# Patient Record
Sex: MALE | Race: ASIAN | Employment: FULL TIME | URBAN - METROPOLITAN AREA
[De-identification: names, ages, dates, MRNs, and addresses within clinical notes are randomized per-mention and may not be internally consistent; named-entity substitution may affect disease eponyms.]

---

## 2017-08-04 ENCOUNTER — HOSPITAL ENCOUNTER (EMERGENCY)
Age: 42
Discharge: HOME | End: 2017-08-04
Attending: EMERGENCY MEDICINE
Payer: COMMERCIAL

## 2017-08-04 VITALS
BODY MASS INDEX: 31.07 KG/M2 | DIASTOLIC BLOOD PRESSURE: 94 MMHG | HEART RATE: 110 BPM | SYSTOLIC BLOOD PRESSURE: 134 MMHG | HEIGHT: 68 IN | WEIGHT: 205 LBS | OXYGEN SATURATION: 97 % | TEMPERATURE: 98.3 F | RESPIRATION RATE: 18 BRPM

## 2017-08-04 DIAGNOSIS — J06.9 ACUTE UPPER RESPIRATORY INFECTION: ICD-10-CM

## 2017-08-04 DIAGNOSIS — R11.0 NAUSEA: ICD-10-CM

## 2017-08-04 DIAGNOSIS — H66.93 BILATERAL OTITIS MEDIA, UNSPECIFIED CHRONICITY, UNSPECIFIED OTITIS MEDIA TYPE: ICD-10-CM

## 2017-08-04 DIAGNOSIS — R19.7 DIARRHEA, UNSPECIFIED TYPE: ICD-10-CM

## 2017-08-04 DIAGNOSIS — K12.2 UVULITIS: Primary | ICD-10-CM

## 2017-08-04 LAB
DIRECT EXAM: NORMAL
Lab: NORMAL
SPECIMEN DESCRIPTION: NORMAL
STATUS: NORMAL

## 2017-08-04 PROCEDURE — 6360000002 HC RX W HCPCS: Performed by: EMERGENCY MEDICINE

## 2017-08-04 PROCEDURE — 87651 STREP A DNA AMP PROBE: CPT

## 2017-08-04 PROCEDURE — 6370000000 HC RX 637 (ALT 250 FOR IP): Performed by: EMERGENCY MEDICINE

## 2017-08-04 PROCEDURE — 99284 EMERGENCY DEPT VISIT MOD MDM: CPT

## 2017-08-04 RX ORDER — IBUPROFEN 800 MG/1
800 TABLET ORAL ONCE
Status: COMPLETED | OUTPATIENT
Start: 2017-08-04 | End: 2017-08-04

## 2017-08-04 RX ORDER — ONDANSETRON 4 MG/1
4 TABLET, FILM COATED ORAL ONCE
Status: COMPLETED | OUTPATIENT
Start: 2017-08-04 | End: 2017-08-04

## 2017-08-04 RX ORDER — AMOXICILLIN 500 MG/1
500 TABLET, FILM COATED ORAL 3 TIMES DAILY
Qty: 21 TABLET | Refills: 0 | Status: SHIPPED | OUTPATIENT
Start: 2017-08-04 | End: 2017-08-11

## 2017-08-04 RX ORDER — AMOXICILLIN 250 MG/1
500 CAPSULE ORAL ONCE
Status: COMPLETED | OUTPATIENT
Start: 2017-08-04 | End: 2017-08-04

## 2017-08-04 RX ORDER — ROSUVASTATIN CALCIUM 10 MG/1
10 TABLET, COATED ORAL DAILY
COMMUNITY
End: 2020-03-20 | Stop reason: SDUPTHER

## 2017-08-04 RX ORDER — ONDANSETRON 4 MG/1
4 TABLET, ORALLY DISINTEGRATING ORAL EVERY 8 HOURS PRN
Qty: 20 TABLET | Refills: 0 | Status: SHIPPED | OUTPATIENT
Start: 2017-08-04 | End: 2021-10-22

## 2017-08-04 RX ADMIN — AMOXICILLIN 500 MG: 250 CAPSULE ORAL at 22:02

## 2017-08-04 RX ADMIN — ONDANSETRON 4 MG: 4 TABLET, FILM COATED ORAL at 22:02

## 2017-08-04 RX ADMIN — IBUPROFEN 800 MG: 800 TABLET, FILM COATED ORAL at 22:02

## 2017-08-04 ASSESSMENT — PAIN DESCRIPTION - LOCATION: LOCATION: THROAT

## 2017-08-04 ASSESSMENT — PAIN SCALES - GENERAL
PAINLEVEL_OUTOF10: 2
PAINLEVEL_OUTOF10: 7

## 2017-08-04 ASSESSMENT — ENCOUNTER SYMPTOMS
NAUSEA: 1
SORE THROAT: 0
WHEEZING: 0
DIARRHEA: 1
SHORTNESS OF BREATH: 0
COUGH: 1
ABDOMINAL PAIN: 0
VOMITING: 1

## 2017-08-04 ASSESSMENT — PAIN DESCRIPTION - DESCRIPTORS: DESCRIPTORS: SORE

## 2017-08-05 LAB
DIRECT EXAM: NORMAL
DIRECT EXAM: NORMAL
Lab: NORMAL
Lab: NORMAL
SPECIMEN DESCRIPTION: NORMAL
SPECIMEN DESCRIPTION: NORMAL
STATUS: NORMAL

## 2020-03-05 PROBLEM — E66.09 CLASS 1 OBESITY DUE TO EXCESS CALORIES WITH SERIOUS COMORBIDITY AND BODY MASS INDEX (BMI) OF 31.0 TO 31.9 IN ADULT: Status: ACTIVE | Noted: 2020-03-05

## 2020-03-05 PROBLEM — E11.9 TYPE 2 DIABETES MELLITUS WITHOUT COMPLICATION, WITHOUT LONG-TERM CURRENT USE OF INSULIN (HCC): Status: ACTIVE | Noted: 2020-03-05

## 2020-03-05 PROBLEM — Z76.89 ENCOUNTER TO ESTABLISH CARE WITH NEW DOCTOR: Status: ACTIVE | Noted: 2020-03-05

## 2020-03-05 PROBLEM — R53.83 FATIGUE: Status: ACTIVE | Noted: 2020-03-05

## 2020-03-05 PROBLEM — R74.8 ELEVATED SERUM GGT LEVEL: Status: ACTIVE | Noted: 2020-03-05

## 2020-03-05 PROBLEM — Z12.5 SCREENING PSA (PROSTATE SPECIFIC ANTIGEN): Status: ACTIVE | Noted: 2020-03-05

## 2020-03-05 PROBLEM — E78.5 HYPERLIPIDEMIA: Status: ACTIVE | Noted: 2020-03-05

## 2020-03-05 PROBLEM — E66.811 CLASS 1 OBESITY DUE TO EXCESS CALORIES WITH SERIOUS COMORBIDITY AND BODY MASS INDEX (BMI) OF 31.0 TO 31.9 IN ADULT: Status: ACTIVE | Noted: 2020-03-05

## 2020-03-23 PROBLEM — E78.1 HIGH TRIGLYCERIDES: Status: ACTIVE | Noted: 2020-03-23

## 2020-04-04 PROBLEM — Z12.5 SCREENING PSA (PROSTATE SPECIFIC ANTIGEN): Status: RESOLVED | Noted: 2020-03-05 | Resolved: 2020-04-04

## 2020-10-27 PROBLEM — R00.0 TACHYCARDIA: Status: ACTIVE | Noted: 2020-10-27

## 2020-10-27 PROBLEM — R20.2 TINGLING: Status: ACTIVE | Noted: 2020-10-27

## 2020-10-27 PROBLEM — I10 ESSENTIAL HYPERTENSION: Status: ACTIVE | Noted: 2020-10-27

## 2021-02-06 PROBLEM — Z13.29 SCREENING FOR THYROID DISORDER: Status: ACTIVE | Noted: 2020-03-05

## 2021-02-17 PROBLEM — H60.92 OTITIS EXTERNA OF LEFT EAR: Status: ACTIVE | Noted: 2021-02-17

## 2021-02-17 PROBLEM — R19.12: Status: ACTIVE | Noted: 2021-02-17

## 2021-02-17 PROBLEM — H04.129 DRY EYE: Status: ACTIVE | Noted: 2021-02-17

## 2021-03-08 PROBLEM — Z13.29 SCREENING FOR THYROID DISORDER: Status: RESOLVED | Noted: 2020-03-05 | Resolved: 2021-03-08

## 2021-06-02 PROBLEM — I27.20 MILD PULMONARY HYPERTENSION (HCC): Status: ACTIVE | Noted: 2021-06-02

## 2021-10-25 PROBLEM — J02.8 ACUTE PHARYNGITIS DUE TO OTHER SPECIFIED ORGANISMS: Status: ACTIVE | Noted: 2021-10-25

## 2021-10-25 PROBLEM — R37 SEXUAL DYSFUNCTION: Status: ACTIVE | Noted: 2021-10-25

## 2021-10-25 PROBLEM — M25.512 ACUTE PAIN OF LEFT SHOULDER: Status: ACTIVE | Noted: 2021-10-25

## 2021-10-25 PROBLEM — M54.2 NECK ACHE: Status: ACTIVE | Noted: 2021-10-25

## 2021-11-05 PROBLEM — M50.30 DEGENERATIVE DISC DISEASE, CERVICAL: Status: ACTIVE | Noted: 2021-11-05

## 2021-11-15 ENCOUNTER — HOSPITAL ENCOUNTER (OUTPATIENT)
Dept: PHYSICAL THERAPY | Facility: CLINIC | Age: 46
Setting detail: THERAPIES SERIES
Discharge: HOME | End: 2021-11-15
Payer: COMMERCIAL

## 2021-11-15 PROCEDURE — 97110 THERAPEUTIC EXERCISES: CPT

## 2021-11-15 PROCEDURE — 97161 PT EVAL LOW COMPLEX 20 MIN: CPT

## 2021-11-15 NOTE — CONSULTS
[] [facility]) - [facility] [[email or web address]] [facility]  [facility]  [address]  [phone]  F: [phone] [x] [address]  [place] [address]   [address]  P: [phone]  F: [phone] [] [address] &  Therapy  [address]  P: [phone]  F: [phone] [] [address]  P: [phone]  F: [phone] [] [address]  [facility]   [address]   [place]: [phone]  F: [phone]      Physical Therapy Spine Evaluation    Date:  11/15/2021  Patient: Edwena Councilman  : 1975  MRN: [id number]  Physician: Ani Gallo MD  Insurance: [employer] (83B/20, $30 copay)  Medical Diagnosis: M50.30- Degenerative disc disease, cervical  Rehab Codes: M50.30, M25.512, 929.3, M54. 2  Onset Date: 2021    Next 's appt.:     Subjective:   CC: 51 y.o. male presents to physical therapy with complaint of neck and L shoulder pain. HPI: (onset date)  Initial pain began in august with insidious onset. Patient does recall leaving his office carrying a heavy bookbag and holding a coffee cup in L hand and experienced first sharp pain. Pain has been intermittent following onset but has not improved. Pt describes pain as pinching and tightness through L neck/scapular region. Pain does not radiate through L arm. Patient works with an [employer] and works with 2 laptops having to maintain static positions prolonged. Pt does not report increased headaches or migraines but does note occasional dizziness when bending forward. Pt describes slight discomfort through R hand/fingers as well that he attributes to his job. Patient has been managing pain with medication occasionally, and stretching. Pt was prescribed muscle relaxants but has not taken them yet.  Pt notes a reduction in strength in his hands with difficulty opening jars/2 liters. Patient is R handed. PMHx: [x] Diabetes               [x] Refer to full medical chart  In EPIC       Comorbidities:   [] Obesity [] Dialysis  [] N/A   [] Asthma/COPD [] Dementia [x] Other:  Diabetes    [] Stroke [] Sleep apnea [] Other:   [] Vascular disease [] Rheumatic disease [] Other:     Tests: [x] X-Ray: Multilevel endplate degenerative changes, with disc height loss at C6-7. AC joint degenerative changes. [] MRI:  [] Other:    Medications: [x] Refer to full medical record [] None [] Other:  Allergies:      [] Refer to full medical record [x] None [] Other:    Function:  Hand Dominance  [x] Right  [] Left  Patient lives with: wife   In what type of home [x]  One story   [] Two story   [] Split level   Number of stairs to enter 0   With handrail on the []  Right to enter   [] Left to enter   Bathroom has a []  Tub only  [x] Tub/shower combo   [] Walk in shower    []  Grab bars   Washing machine is on [x]  Main level   [] Second level   [] Basement   Employer IT tech   Job Status [x]  Normal duty   [] Light duty   [] Off due to condition    []  Retired   [] Not employed   [] Disability  [] Other:  []  Return to work:    Work activities/duties Sitting prolonged, computer work, typing   Recreational activities  N/A       ADL/IADL Previous level of function Current level of function Who currently assists the patient with task   Bathing  [x] Independent  [] Assist [x] Independent  [] Assist    Dress/grooming [x] Independent  [] Assist [x] Independent  [] Assist    Transfer/mobility [x] Independent  [] Assist [x] Independent  [] Assist    Feeding [x] Independent  [] Assist [x] Independent  [] Assist    Toileting [x] Independent  [] Assist [x] Independent  [] Assist    Driving [x] Independent  [] Assist [] Independent  [x] Assist Wife   Housekeeping [x] Independent  [] Assist [x] Independent  [] Assist    Grocery shop/meal prep [x] Independent  [] Assist [x] Independent  [] Assist      Gait Prior level of function Current level of function    [x] Independent  [] Assist [x] Independent  [] Assist   Device: [x] Independent [x] Independent       Symptoms:    Pain present? Yes   Location L sided neck and shoulder    Pain Rating currently 4/10   Pain at worst 8/10   Pain at best 0/10   Description of pain Intermittent, pinching, tightness    Altered Sensation None    What makes it worse Sitting prolonged, lifting and carrying   What makes it better Stretching, medications   Symptom progression static   Sleep Not disturbed            Objective:      STRENGTH  ROM    Left Right Cervical    C5 Shld Abd 5 5 Flexion 40   Shld Flexion 5 5 Extension 50   Shld IR 5 5 Rotation L- 60 R- 55   Shld ER 5 5 Sidebend L- 30 R- 40   C6 Elb Flex 5 5 Retraction    C7 Elb Ext 5 5 Lumbar    C8 EPL   Flexion    T1 Fing Abd   Extension     78 58 Rotation L  R      Sidebend L R      UE/LE L R      shoulder WFL globally WFL globally                                      TESTS (+/-) LEFT RIGHT Not Tested   Cerv. Comp - - []   Cerv. Distraction - - []   Cerv. Alar/Transverse - - []   Vertebral Artery - - []   Adsons - - []   Adryan Ayoub - - []   Hu Tests ? Pain ?  Pain No Change Not Tested   RFIS [] [] [] [x]   NELSON [] [] [] [x]   RFIL [] [] [] [x]   REIL [] [] [] [x]   Rep Prot [] [] [x] []   Rep Retract [] [] [x] []       OBSERVATION No Deficit Deficit Not Tested Comments   Posture       Forward Head [] [x] []    Rounded Shoulders [] [x] []    Kyphosis [] [x] [] Increased thoracic kyphosis   Lordosis [] [x] [] Reduced cervical lordosis   Slumped Sitting [] [x] []    Palpation [] [x] [] Increased tone through L UT globally, L sided cervical paraspinals, suboccipitals   Sensation [x] [] []    Edema [x] [] []    Neurological [x] [] []        Functional Test: NDI Score: 10/50 or 20% functionally impaired     Comments:  Pt with over activation of L UT with scapular retraction, improved with sitting at work. 5. Patient to be independent with home exercise program as demonstrated by performance with correct form without cues. LTG: (to be met in 12 treatments)  1. Pt to report neck pain levels at 0-1/10 to improve tolerance with all daily activities. 2. Pt to demonstrate improved function with NDI score of 10% functionally impaired or less. 3. Pt to demonstrate improved functional strength with ability to lift and carry at least 10# without increased pain. Patient goals: improve strength and flexibility     Rehab Potential:  [x] Good  [] Fair  [] Poor   Suggested Professional Referral:  [x] No  [] Yes:  Barriers to Goal Achievement:  [x] No  [] Yes:  Domestic Concerns:  [x] No  [] Yes:    Pt. Education:  [x] Plans/Goals, Risks/Benefits discussed  [x] Home exercise program  Method of Education: [x] Verbal  [x] Demo  [x] Written   11/15/21: HEP provided of charted exercises, [employer] #[id number], postural education  Comprehension of Education:  [x] Verbalizes understanding. [x] Demonstrates understanding. [x] Needs Review. [] Demonstrates/verbalizes understanding of HEP/Ed previously given.     Treatment Plan:  [x] Therapeutic Exercise   83204  [] Iontophoresis: 4 mg/mL Dexamethasone Sodium Phosphate  mAmin  18643   [] Therapeutic Activity  32995 [] Vasopneumatic cold with compression  11306    [] Gait Training   61036 [] Ultrasound   68985   [x] Neuromuscular Re-education  09412 [] Electrical Stimulation Unattended  98624   [x] Manual Therapy  63429 [] Electrical Stimulation Attended  79890   [x] Instruction in HEP  [] Lumbar/Cervical Traction  00051   [] Aquatic Therapy   80496 [x] Cold/hotpack    [] Massage   23430      [] Dry Needling, 1 or 2 muscles  81660   [] Biofeedback, first 15 minutes   42973  [] Biofeedback, additional 15 minutes   29237 [] Dry Needling, 3 or more muscles  90824     []  Medication allergies reviewed for use of    Dexamethasone Sodium Phosphate 4mg/ml     with iontophoresis treatments. Pt is not allergic. Frequency:  2 x/week for 12 visits (will be on 1 month hold beginning 11/26 due to traveling out of the country)    [address] [employer] Treatment:  Modalities:   Precautions:  Exercises:  Exercise Reps/ Time Weight/ Level Comments         Seated chin tucks 10x5\"     Scapular retractions 10x5\"      UT stretch 3x20\"     Doorway Pec stretch 3x20\"       Other:  Postural education provided for seated positioning during work       Specific Instructions for next treatment: manual to L UT, SOR, cervical paraspinals, progress scapular and deep cervical flexor strengthening, restore cervical ROM, reinforce postural education      Evaluation Complexity:  History (Personal factors, comorbidities) [] 0 [x] 1-2 [] 3+   Exam (limitations, restrictions) [] 1-2 [] 3 [x] 4+   Clinical presentation (progression) [x] Stable [] Evolving  [] Unstable   Decision Making [x] Low [] Moderate [] High    [x] Low Complexity [] Moderate Complexity [] High Complexity       Treatment Charges: Mins Units   [x] Evaluation       [x]  Low       []  Moderate       []  High 35 1   []  Modalities     [x]  Ther Exercise 10 1   []  Manual Therapy     []  Ther Activities     []  Aquatics     []  Vasocompression     []  Other       TOTAL TREATMENT TIME: 45 minutes     Time in: 12:05 pm      Time out: 12:50 pm    Electronically signed by: Serenity Jones PT        Physician Signature:________________________________Date:__________________  By signing above or cosigning this note, I have reviewed this plan of care and certify a need for medically necessary rehabilitation services.      *PLEASE SIGN ABOVE AND FAX BACK ALL PAGES*

## 2021-11-18 ENCOUNTER — HOSPITAL ENCOUNTER (OUTPATIENT)
Dept: PHYSICAL THERAPY | Facility: CLINIC | Age: 46
Setting detail: THERAPIES SERIES
Discharge: HOME | End: 2021-11-18
Payer: COMMERCIAL

## 2021-11-18 PROCEDURE — 97140 MANUAL THERAPY 1/> REGIONS: CPT

## 2021-11-18 PROCEDURE — 97110 THERAPEUTIC EXERCISES: CPT

## 2021-11-18 NOTE — FLOWSHEET NOTE
[] [facility]) - [facility] &  Therapy  [address]  [phone]  F: [phone] [] [address]  [facility] [address]   [address]  P: [phone]  F: [phone] [] [address] &  Therapy  [address]  P: [phone]  F: [phone] [] [address]  P: [phone]  F: [phone] [] [address]  [facility]   [address]   [place]: [phone]  F: [phone]      Physical Therapy Daily Treatment Note    Date:  2021  Patient Name:  Taz High    :  1975  MRN: [id number]  Physician: Samuel Durham MD               Insurance: [employer] (70V/60, $30 copay)  Medical Diagnosis: M50.30- Degenerative disc disease, cervical              Rehab Codes: M50.30, M25.512, 929.3, M54. 2  Onset Date: 2021                              Next 's appt. :   Visit# / total visits: ;  (will be on 1 month hold beginning  due to traveling out of the country)   Cancels/No Shows :0 /0    Subjective:    Pain:  [x] Yes  [] No Location: L sided neck and shoulder  Pain Rating: (0-10 scale) did not give numerical value/10  Pain altered Tx:  [x] No  [] Yes  Action:  Comments :Pt arrives noting he is okay, somewhat sore from exercise but has been doing them    Objective:  Modalities:   Precautions:  Exercises:  Exercise Reps/ Time Weight/ Level Comments   UBE 2'/2' lv 1           Manual :  11 min   SOR, UT releases, MFR to c paraspinals         Supine       Chin tucks 10x5\"      Cervical mobs over ball 5x5\" ea  All directions               Seated      Chin tucks 10x5\"       Scapular retractions 10x2   5\"        Retro shoulder rolls       UT stretch 3x20\"                     Tband rows 20x  blue    Wall angels 10x     Wall push ups 2x10 Doorway Pec stretch 3x20\"        Other:  Postural education provided for seated positioning during work         Specific Instructions for next treatment: manual to L UT, SOR, cervical paraspinals, progress scapular and deep cervical flexor strengthening, restore cervical ROM, reinforce postural education         Treatment Charges: Mins Units   []  Modalities     []  Ther Exercise 30 2   []  Manual Therapy 11 1   []  Ther Activities     []  Aquatics     []  Vasocompression     []  Other     Total Treatment time 41 3       Assessment: [x] Progressing toward goals. Initiated treatment with warm up followed by manual interventions. Pt with increased muscular tension present in cervical paraspinals. Cueing to ensure proper exercise completion this date. Pt able to complete all interventions as indicated. Cueing to ensure patient is conscious of posture with exercises. Provided patient with putty, tband and a new updated HEP. Will need to review and add to it prior to him leaving for [facility] for 1 month. [] No change. [] Other:  [] Patient would continue to benefit from skilled physical therapy services in order to: reduce pain, restore cervical mobility, and increase deep cervical flexor and scapular strengthening in order to reduce difficulty with work and household related tasks. Problems:    [x]? ? Pain: up to 8/10 pain L sided neck and shoulder  [x]? ? ROM: reduced cervical ROM globally   [x]? ? Strength: reduced deep cervical flexor and scapular strengthening  [x]? ? Function: impaired function indicated by NDI score of 20% impaired   [x]? Other: impaired posture       STG: (to be met in 8 treatments)  1. ? Pain: Pt to report neck pain levels at 5/10 or less to improve tolerance to therapeutic exercise progressions. 2. ? ROM: Pt to improve cervical rotation AROM to at least 70 degrees and cervical sidebend to at least 45 degrees bilaterally to ease difficulty with ADLs.    3. ? Strength: Pt to demonstrate improved scapular strength at 4+/5 globally to improve independence with heavier lifting and carrying activities. a. Pt to demonstrate increased deep cervical flexor strengthening with ability to perform 10 chin tucks without increased pain or compensation. 4. ? Function: Pt to demonstrate improved postural awareness in seated position for reduced pain with sustained sitting at work. 5. Patient to be independent with home exercise program as demonstrated by performance with correct form without cues.     LTG: (to be met in 12 treatments)  1. Pt to report neck pain levels at 0-1/10 to improve tolerance with all daily activities. 2. Pt to demonstrate improved function with NDI score of 10% functionally impaired or less. 3. Pt to demonstrate improved functional strength with ability to lift and carry at least 10# without increased pain.      Patient goals: improve strength and flexibility     Pt. Education:  [x] Yes  [] No  [] Reviewed Prior HEP/Ed  Method of Education: [x] Verbal  [] Demo  [x] Written  Access Code: [email or web address]  URL: [employer]/  Date: 11/19/2021  Prepared by: Paras Reece    Exercises  Seated Upper Trapezius Stretch - 1 x daily - 7 x weekly - 3 sets - 20\" hold  Seated Scapular Retraction - 1 x daily - 7 x weekly - 10 reps - 3\" hold  Correct Seated Posture - 1 x daily - 7 x weekly  Seated Cervical Retraction - 1 x daily - 7 x weekly - 3 sets - 10 reps  Doorway Pec Stretch at 90 Degrees Abduction - 1 x daily - 7 x weekly - 3 sets - 10 reps  Putty Squeezes - 1 x daily - 7 x weekly - 2 sets - 10 reps  Scapular Retraction with Resistance - 1 x daily - 7 x weekly - 2 sets - 10 reps  Standing Shoulder External Rotation with Resistance - 1 x daily - 7 x weekly - 2 sets - 10 reps  Wall Push Up - 1 x daily - 7 x weekly - 2 sets - 10 reps  Wall [place] - 1 x daily - 7 x weekly - 1 sets - 10 reps    Comprehension of Education:  [x] Verbalizes understanding.   [] Demonstrates understanding. [x] Needs review. [] Demonstrates/verbalizes HEP/Ed previously given. Plan: [x] Continue current frequency toward long and short term goals.     [x] Specific Instructions for subsequent treatments:progress as able, review HEP for when he is out of town      Time In:5 pm            Time Out: 555 pm     Electronically signed by:  Porsha Chaudhry PTA

## 2021-11-22 ENCOUNTER — HOSPITAL ENCOUNTER (OUTPATIENT)
Dept: PHYSICAL THERAPY | Facility: CLINIC | Age: 46
Setting detail: THERAPIES SERIES
Discharge: HOME | End: 2021-11-22
Payer: COMMERCIAL

## 2021-11-22 PROCEDURE — 97140 MANUAL THERAPY 1/> REGIONS: CPT

## 2021-11-22 PROCEDURE — 97110 THERAPEUTIC EXERCISES: CPT

## 2021-11-22 NOTE — FLOWSHEET NOTE
[] [facility]) - [facility] &  Therapy  [address]  [phone]  F: [phone] [] [address]  [facility]   [address]  P: [phone]  F: [phone] [] [address] &  Therapy  [address]  P: [phone]  F: [phone] [] [address]  P: [phone]  F: [phone] [] [address]  [facility]   [address]   [place]: [phone]  F: [phone]      Physical Therapy Daily Treatment Note    Date:  2021  Patient Name:  Ean Mason    :  1975  MRN: [id number]  Physician: Jefe Orr MD               [facility] (81P/62, $30 copay)  Medical Diagnosis: M50.30- Degenerative disc disease, cervical              Rehab Codes: M50.30, M25.512, 929.3, M54. 2  Onset Date: 2021                              Next 's appt. :   Visit# / total visits: 3/12;  (will be on 1 month hold beginning  due to traveling out of the country)   Cancels/No Shows :0 /0    Subjective:    Pain:  [x] Yes  [] No Location: L sided neck and shoulder  Pain Rating: (0-10 scale) 3/10  Pain altered Tx:  [x] No  [] Yes  Action:    Comments :Pt states his shoulder and neck pain are improving, however he is feeling more dizzy that before.      Objective:  Modalities:   Precautions:  Exercises:  Exercise Reps/ Time Weight/ Level Comments   UBE 2'/2' lv 1           Manual :  12 min   SOR, UT releases, MFR to c paraspinals         Supine       Chin tucks 10x5\"      Cervical mobs over ball   All directions               Seated      Chin tucks 10x5\"       Scapular retractions 10x2   5\"        Retro shoulder rolls       UT stretch 3x20\"                     Tband rows 20x  blue    tband ER  Lime     tband shoulder extension   Lime     tricep press  Lime     Wall angels 10x, 3\" hold     Wall push ups 2x10     Doorway Pec stretch 3x20\"        Other:  Postural education provided for seated positioning during work         Specific Instructions for next treatment: manual to L UT, SOR, cervical paraspinals, progress scapular and deep cervical flexor strengthening, restore cervical ROM, reinforce postural education         Treatment Charges: Mins Units   []  Modalities     []  Ther Exercise 20 1   []  Manual Therapy 12 1   []  Ther Activities     []  Aquatics     []  Vasocompression     []  Other     Total Treatment time 32 2       Assessment: [x] Progressing toward goals. Continued with treatment as before, progressing exercises with good tolerance. Updated pt's HEP and issued a lime tband for him to include in his travels for HEP consistency. Pt denies complains following session. [] No change. [] Other:  [] Patient would continue to benefit from skilled physical therapy services in order to: reduce pain, restore cervical mobility, and increase deep cervical flexor and scapular strengthening in order to reduce difficulty with work and household related tasks. Problems:    [x]? ? Pain: up to 8/10 pain L sided neck and shoulder  [x]? ? ROM: reduced cervical ROM globally   [x]? ? Strength: reduced deep cervical flexor and scapular strengthening  [x]? ? Function: impaired function indicated by NDI score of 20% impaired   [x]? Other: impaired posture       STG: (to be met in 8 treatments)  1. ? Pain: Pt to report neck pain levels at 5/10 or less to improve tolerance to therapeutic exercise progressions. 2. ? ROM: Pt to improve cervical rotation AROM to at least 70 degrees and cervical sidebend to at least 45 degrees bilaterally to ease difficulty with ADLs. 3. ? Strength: Pt to demonstrate improved scapular strength at 4+/5 globally to improve independence with heavier lifting and carrying activities. a.  Pt to demonstrate increased deep cervical flexor strengthening with ability to perform 10 chin tucks without increased pain or compensation. 4. ? Function: Pt to demonstrate improved postural awareness in seated position for reduced pain with sustained sitting at work. 5. Patient to be independent with home exercise program as demonstrated by performance with correct form without cues.     LTG: (to be met in 12 treatments)  1. Pt to report neck pain levels at 0-1/10 to improve tolerance with all daily activities. 2. Pt to demonstrate improved function with NDI score of 10% functionally impaired or less. 3. Pt to demonstrate improved functional strength with ability to lift and carry at least 10# without increased pain.      Patient goals: improve strength and flexibility     Pt. Education:  [x] Yes  [] No  [] Reviewed Prior HEP/Ed  Method of Education: [x] Verbal  [x] Demo  [x] Written- updated medbridge and issued lime band  Access Code: [email or web address]  URL: [email or web address]  Date: 11/19/2021  Prepared by: Fide Bedoya    Exercises  Seated Upper Trapezius Stretch - 1 x daily - 7 x weekly - 3 sets - 20\" hold  Seated Scapular Retraction - 1 x daily - 7 x weekly - 10 reps - 3\" hold  Correct Seated Posture - 1 x daily - 7 x weekly  Seated Cervical Retraction - 1 x daily - 7 x weekly - 3 sets - 10 reps  Doorway Pec Stretch at 90 Degrees Abduction - 1 x daily - 7 x weekly - 3 sets - 10 reps  Putty Squeezes - 1 x daily - 7 x weekly - 2 sets - 10 reps  Scapular Retraction with Resistance - 1 x daily - 7 x weekly - 2 sets - 10 reps  Standing Shoulder External Rotation with Resistance - 1 x daily - 7 x weekly - 2 sets - 10 reps  Wall Push Up - 1 x daily - 7 x weekly - 2 sets - 10 reps  Wall [place] - 1 x daily - 7 x weekly - 1 sets - 10 reps    Comprehension of Education:  [x] Verbalizes understanding. [x] Demonstrates understanding. [x] Needs review. [] Demonstrates/verbalizes HEP/Ed previously given.      Plan: [x] Continue current frequency toward long and short term goals.     [x] Specific Instructions for subsequent treatments:progress as able, review HEP for when he is out of town      Time In: 6:00pm              Time Out: 6:36pm     Electronically signed by:  Deepthi Noble PTA

## 2021-11-23 ENCOUNTER — HOSPITAL ENCOUNTER (OUTPATIENT)
Dept: PHYSICAL THERAPY | Facility: CLINIC | Age: 46
Setting detail: THERAPIES SERIES
Discharge: HOME | End: 2021-11-23
Payer: COMMERCIAL

## 2021-11-23 PROCEDURE — 97110 THERAPEUTIC EXERCISES: CPT

## 2021-11-23 PROCEDURE — 97140 MANUAL THERAPY 1/> REGIONS: CPT

## 2021-11-23 NOTE — FLOWSHEET NOTE
[] [facility]) - [facility] &  Therapy  [address]  [phone]  F: [phone] [] [address]  [place] [address]   [address]  P: [phone]  F: [phone] [] [address] &  Therapy  [address]  P: [phone]  F: [phone] [] [address]  P: [phone]  F: [phone] [] [address]  [facility]   [address]   [place]: [phone]  F: [phone]      Physical Therapy Daily Treatment Note    Date:  2021  Patient Name:  Julio Thomas    :  1975  MRN: [id number]  Physician: Mini Guerra MD               Insurance: [employer] (68O/83, $30 copay)  Medical Diagnosis: M50.30- Degenerative disc disease, cervical              Rehab Codes: M50.30, M25.512, 929.3, M54. 2  Onset Date: 2021                              Next 's appt. :   Visit# / total visits: ;  (will be on 1 month hold beginning  due to traveling out of the country)   Cancels/No Shows :0 /0    Subjective:    Pain:  [] Yes  [x] No Location: L sided neck and shoulder  Pain Rating: (0-10 scale) 0/10  Pain altered Tx:  [x] No  [] Yes  Action:  Comments :Pt arrives without pain or soreness this date. Pt notes that it depends what he is doing with his neck that will increase soreness but overall feels good.      Objective:  Modalities:   Precautions:  Exercises:  Exercise Reps/ Time Weight/ Level Comments   UBE 2'/2' lv 1           Manual :  11 min   SOR, UT releases, MFR to c paraspinals         Supine       Chin tucks 10x5\"      Chin tucks + rotation 5x5\"ea     Cervical mobs over ball 5x5\" ea  All directions   Scapular Depression 10x5\"  Added          Seated      Chin tucks 10x5\"       Scapular retractions 10x2   5\"        Retro shoulder rolls 10x     UT stretch 3x20\"                     Tband rows 20x  blue    B. ER 20x blue Chin Tuck- against wall for cueing - added 11/23   B. H. Abd 20x blue Chin Tuck- against wall for cueing- added 11/23   Wall angels 10x  Cues for chin tuck   Wall push ups 2x10     Reverse wall push up 2x10     Doorway Pec stretch 3x20\"        Other:  Postural education provided for seated positioning during work         Specific Instructions for next treatment: manual to L UT, SOR, cervical paraspinals, progress scapular and deep cervical flexor strengthening, restore cervical ROM, reinforce postural education         Treatment Charges: Mins Units   []  Modalities     []  Ther Exercise 32 2   []  Manual Therapy 12 1   []  Ther Activities     []  Aquatics     []  Vasocompression     []  Other     Total Treatment time 44 3       Assessment: [x] Progressing toward goals. Initiated treatment with warm up followed by manual interventions. Pt with increased muscular tension present in cervical paraspinals and bilateral UT with fair relief of tissue tightness following manual. Continued with charted exercises adding reverse wall push ups, bilateral ER and horizontal abduction with chin tuck, and scapular depression. Reviewed previous HEP as well. Pt with good tolerance to all exercises and progressions this date and is able to complete previous exercises with minimal cueing for appropriate technique. Pt to be on hold until last week of December as he is going out of the country. Plan to continue HEP independently. [] No change. [] Other:  [] Patient would continue to benefit from skilled physical therapy services in order to: reduce pain, restore cervical mobility, and increase deep cervical flexor and scapular strengthening in order to reduce difficulty with work and household related tasks. Problems:    [x]? ? Pain: up to 8/10 pain L sided neck and shoulder  [x]? ? ROM: reduced cervical ROM globally   [x]? ?  Strength: reduced deep cervical flexor and scapular strengthening  [x]? ? Function: impaired function indicated by NDI score of 20% impaired   [x]? Other: impaired posture       STG: (to be met in 8 treatments)  1. ? Pain: Pt to report neck pain levels at 5/10 or less to improve tolerance to therapeutic exercise progressions. 2. ? ROM: Pt to improve cervical rotation AROM to at least 70 degrees and cervical sidebend to at least 45 degrees bilaterally to ease difficulty with ADLs. 3. ? Strength: Pt to demonstrate improved scapular strength at 4+/5 globally to improve independence with heavier lifting and carrying activities. a. Pt to demonstrate increased deep cervical flexor strengthening with ability to perform 10 chin tucks without increased pain or compensation. 4. ? Function: Pt to demonstrate improved postural awareness in seated position for reduced pain with sustained sitting at work. 5. Patient to be independent with home exercise program as demonstrated by performance with correct form without cues.     LTG: (to be met in 12 treatments)  1. Pt to report neck pain levels at 0-1/10 to improve tolerance with all daily activities. 2. Pt to demonstrate improved function with NDI score of 10% functionally impaired or less. 3. Pt to demonstrate improved functional strength with ability to lift and carry at least 10# without increased pain.      Patient goals: improve strength and flexibility     Pt. Education:  [x] Yes  [] No  [] Reviewed Prior HEP/Ed  Method of Education: [x] Verbal  [x] Demo  [] Written  Access Code: [email or web address]  URL: [email or web address]. com/  Date: 11/19/2021  Prepared by: Yariel Phoenix    Exercises  Seated Upper Trapezius Stretch - 1 x daily - 7 x weekly - 3 sets - 20\" hold  Seated Scapular Retraction - 1 x daily - 7 x weekly - 10 reps - 3\" hold  Correct Seated Posture - 1 x daily - 7 x weekly  Seated Cervical Retraction - 1 x daily - 7 x weekly - 3 sets - 10 reps  Doorway Pec Stretch at 90 Degrees Abduction - 1 x daily - 7 x weekly - 3 sets - 10 reps  Putty Squeezes - 1 x daily - 7 x weekly - 2 sets - 10 reps  Scapular Retraction with Resistance - 1 x daily - 7 x weekly - 2 sets - 10 reps  Standing Shoulder External Rotation with Resistance - 1 x daily - 7 x weekly - 2 sets - 10 reps  Wall Push Up - 1 x daily - 7 x weekly - 2 sets - 10 reps  Wall [place] - 1 x daily - 7 x weekly - 1 sets - 10 reps    11/23- updated medbridge code [id number] to include scapular depression, bilateral ER, bilateral H. Abd   Comprehension of Education:  [] Verbalizes understanding. [] Demonstrates understanding. [] Needs review. [x] Demonstrates/verbalizes HEP/Ed previously given. Plan: [] Continue current frequency toward long and short term goals. [x] Specific Instructions for subsequent treatments:hold PT until December 27th d/t pt being out of town       Time In: 11:00 am             Time Out:  11:48 am    Electronically signed by:   Che Castaneda PT

## 2021-12-27 ENCOUNTER — HOSPITAL ENCOUNTER (OUTPATIENT)
Dept: PHYSICAL THERAPY | Facility: CLINIC | Age: 46
Setting detail: THERAPIES SERIES
Discharge: HOME | End: 2021-12-27
Payer: COMMERCIAL

## 2021-12-27 NOTE — FLOWSHEET NOTE
[] [facility]) - [facility] &  Therapy  [address]    [phone]  F: [phone]   [x] [address]  [facility] 36   [address]  P: [phone]  F: [phone]  [] [address]  [address]  P: [phone]  F: [phone] [] [address]  P: [phone]  F: [phone]  [] [address]  [facility]   [address]   [place]: [phone]  F: [phone]   [] [facility]  [address]  [place]: [phone]   F: [phone]     Physical Therapy Cancel/No Show note    Date: 2021  Patient: Chadd Jay  : 1975  MRN: [id number]    Cancels/No Shows to date:     For today's appointment patient:    [x]  Cancelled    [] Rescheduled appointment    [] No-show     Reason given by patient:    []  Patient ill    []  Conflicting appointment    [] No transportation      [] Conflict with work    [] No reason given    [] Weather related    [] COVID-19    [x] Other:      Comments: Pt self quarantined due to recent out of the country travel.          [x] Next appointment was confirmed    Electronically signed by: Ana Luisa Ball PTA

## 2021-12-30 ENCOUNTER — HOSPITAL ENCOUNTER (OUTPATIENT)
Dept: PHYSICAL THERAPY | Facility: CLINIC | Age: 46
Setting detail: THERAPIES SERIES
Discharge: HOME | End: 2021-12-30
Payer: COMMERCIAL

## 2021-12-30 PROCEDURE — 97110 THERAPEUTIC EXERCISES: CPT

## 2021-12-30 NOTE — FLOWSHEET NOTE
[] [facility]) - [facility] &  Therapy  [address]  [phone]  F: [phone] [x] [address]  [place] [address]   [address]  P: [phone]  F: [phone] [] [address] &  Therapy  [address]  P: [phone]  F: [phone] [] [address]  P: [phone]  F: [phone] [] [address]  [facility]   [address]   [place]: [phone]  F: [phone]      Physical Therapy Daily Treatment Note    Date:  2021  Patient Name:  Sary Palmer    :  1975  MRN: [id number]  Physician: Samara Montes MD               Insurance: [employer] (75Z/60, $30 copay)  Medical Diagnosis: M50.30- Degenerative disc disease, cervical              Rehab Codes: M50.30, M25.512, 929.3, M54. 2  Onset Date: 2021                              Next 's appt. :   Visit# / total visits: ; Cancels/No Shows :0 /0    Subjective:    Pain:  [] Yes  [x] No Location: L sided neck and shoulder  Pain Rating: (0-10 scale) 0/10  Pain altered Tx:  [x] No  [] Yes  Action:  Comments : Pt arrives noting that he is doing much better since his trip, minimal to no pain.      Objective:  Modalities:   Precautions:  Exercises:  Exercise Reps/ Time Weight/ Level Comments   UBE 2'/2' lv 1           Manual:  11 min   SOR, UT releases, MFR to c paraspinals - not today          Supine       Chin tucks 10x5\"   not today    Chin tucks + rotation 5x5\"ea     Cervical mobs over ball 5x5\" ea  All directions   Scapular Depression 10x5\"  Added  not today          Seated      Chin tucks 10x5\"       Scapular retractions 10x2   5\"        Retro shoulder rolls  10x2  Inc    UT stretch 3x20\"                     Tband rows 20x plum Inc    tband ext 20x plum Added 12/30   B. ER 20x blue Chin Tuck- against wall for cueing - added 11/23   B. H. Abd 20x blue Chin Tuck- against wall for cueing- added 11/23   Wall angels 10x  Cues for chin tuck   Wall push ups 2x10     Reverse wall push up 2x10     Doorway Pec stretch 3x30\"     Resisted wall taps 10 x ea  lime Lateral with scap retration Added 12/30   Resisted wall taps - inferior and superior diagonals 5x ea Lime Added 12/30   Rhythmic stab with ball  Hold at 90°  3x30\"  Small stability ball Added 12/30   SA slides 2x10  Added 12/30            Other:  Postural education provided for seated positioning during work         Specific Instructions for next treatment: manual to L UT, SOR, cervical paraspinals, progress scapular and deep cervical flexor strengthening, restore cervical ROM, reinforce postural education         Treatment Charges: Mins Units   []  Modalities     []  Ther Exercise 39 3   []  Manual Therapy     []  Ther Activities     []  Aquatics     []  Vasocompression     []  Other     Total Treatment time 39 3       Assessment: [x] Progressing toward goals. Initiated session again with warm up on the UBE. Pt able to make progressions with charted above with previous exercises. Pt also able to make progressions with new interventions. Continued cueing to implement cervical retraction to assist with improved posture and decreased forward flexed cervical spine. Cueing also to improve scap retraction during interventions. Pt denies pain end of session, will continue to progress as able. [] No change. [] Other:  [] Patient would continue to benefit from skilled physical therapy services in order to: reduce pain, restore cervical mobility, and increase deep cervical flexor and scapular strengthening in order to reduce difficulty with work and household related tasks. Problems:    [x]? ? Pain: up to 8/10 pain L sided neck and shoulder  [x]? ? ROM: reduced cervical ROM globally   [x]? ?  Strength: reduced deep cervical flexor and scapular strengthening  [x]? ? Function: impaired function indicated by NDI score of 20% impaired   [x]? Other: impaired posture       STG: (to be met in 8 treatments)  1. ? Pain: Pt to report neck pain levels at 5/10 or less to improve tolerance to therapeutic exercise progressions. 2. ? ROM: Pt to improve cervical rotation AROM to at least 70 degrees and cervical sidebend to at least 45 degrees bilaterally to ease difficulty with ADLs. 3. ? Strength: Pt to demonstrate improved scapular strength at 4+/5 globally to improve independence with heavier lifting and carrying activities. a. Pt to demonstrate increased deep cervical flexor strengthening with ability to perform 10 chin tucks without increased pain or compensation. 4. ? Function: Pt to demonstrate improved postural awareness in seated position for reduced pain with sustained sitting at work. 5. Patient to be independent with home exercise program as demonstrated by performance with correct form without cues.     LTG: (to be met in 12 treatments)  1. Pt to report neck pain levels at 0-1/10 to improve tolerance with all daily activities. 2. Pt to demonstrate improved function with NDI score of 10% functionally impaired or less. 3. Pt to demonstrate improved functional strength with ability to lift and carry at least 10# without increased pain.      Patient goals: improve strength and flexibility     Pt. Education:  [x] Yes  [] No  [] Reviewed Prior HEP/Ed  Method of Education: [x] Verbal  [x] Demo  [] Written  Access Code: [email or web address]  URL: [email or web address] [email or web address]  Date: 11/19/2021  Prepared by: Robi Do    Exercises  Seated Upper Trapezius Stretch - 1 x daily - 7 x weekly - 3 sets - 20\" hold  Seated Scapular Retraction - 1 x daily - 7 x weekly - 10 reps - 3\" hold  Correct Seated Posture - 1 x daily - 7 x weekly  Seated Cervical Retraction - 1 x daily - 7 x weekly - 3 sets - 10 reps  Doorway Pec Stretch at 90 Degrees Abduction - 1 x daily - 7 x weekly - 3 sets - 10 reps  Putty Squeezes - 1 x daily - 7 x weekly - 2 sets - 10 reps  Scapular Retraction with Resistance - 1 x daily - 7 x weekly - 2 sets - 10 reps  Standing Shoulder External Rotation with Resistance - 1 x daily - 7 x weekly - 2 sets - 10 reps  Wall Push Up - 1 x daily - 7 x weekly - 2 sets - 10 reps  Wall [place] - 1 x daily - 7 x weekly - 1 sets - 10 reps    11/23- updated [employer] code [id number] to include scapular depression, bilateral ER, bilateral H. Abd   Comprehension of Education:  [] Verbalizes understanding. [] Demonstrates understanding. [] Needs review. [x] Demonstrates/verbalizes HEP/Ed previously given. Plan: [] Continue current frequency toward long and short term goals. [x] Specific Instructions for subsequent treatments: resume progressions.        Time In: 307 pm             Time Out:  350 pm    Electronically signed by:  Hang Madden PTA

## 2022-01-03 ENCOUNTER — HOSPITAL ENCOUNTER (OUTPATIENT)
Dept: PHYSICAL THERAPY | Facility: CLINIC | Age: 47
Setting detail: THERAPIES SERIES
Discharge: HOME | End: 2022-01-03
Payer: COMMERCIAL

## 2022-01-03 PROCEDURE — 97110 THERAPEUTIC EXERCISES: CPT

## 2022-01-03 NOTE — FLOWSHEET NOTE
[] [facility]) - [facility] &  Therapy  [address]  [phone]  F: [phone] [x] [address]  [facility] [address]   [address]  P: [phone]  F: [phone] [] [address] &  Therapy  [address]  P: [phone]  F: [phone] [] [address]  P: [phone]  F: [phone] [] [address]  [facility]   [address]   [place]: [phone]  F: [phone]      Physical Therapy Daily Treatment Note    Date:  1/3/2022  Patient Name:  Maikol De La Cruz    :  1975  MRN: [id number]  Physician: Shashi León MD               Insurance: [employer] (84N/92, $30 copay)  Medical Diagnosis: M50.30- Degenerative disc disease, cervical              Rehab Codes: M50.30, M25.512, 929.3, M54. 2  Onset Date: 2021                              Next 's appt. :   Visit# / total visits:      Cancels/No Shows : 0/0    Subjective:    Pain:  [] Yes  [x] No Location: L sided neck and shoulder  Pain Rating: (0-10 scale) 0/10  Pain altered Tx:  [x] No  [] Yes  Action:  Comments : Pt arrives stating his neck and shoulder are \"ok\" however arrives with pain in his L hand that began minutes before arrival. Pt does not have any notable swelling or discoloration of the hand and does not know what brought on that pain.           Objective:  Modalities:   Precautions:  Exercises: Bold completed 1/3   Exercise Reps/ Time Weight/ Level Comments   UBE 2'/2' lv 1           Manual:  11 min   SOR, UT releases, MFR to c paraspinals - not today          Supine       Chin tucks 10x5\"   not today    Chin tucks + rotation 5x5\"ea     Cervical mobs over ball 5x5\" ea  All directions   Scapular Depression 10x5\"  Added  not today          Seated      Chin tucks 10x5\"       Scapular retractions 10x2   5\"        Retro shoulder rolls  10x2  Inc 12/30   UT stretch 3x20\"                     Tband rows 20x plum Inc 12/30   tband ext 20x plum Added 12/30   B. ER 20x [place]  Chin Tuck- against wall for cueing - added 11/23, inc resistance 1/3   B. H. Abd 20x [place]  Chin Tuck- against wall for cueing- added 11/23, inc resistance 1/3    Wall angels 10x  Cues for chin tuck   Wall push ups 2x10     Reverse wall push up 2x10     Doorway Pec stretch 3x30\"     Resisted wall taps 10 x ea  blue Lateral with scap retration Added 12/30, inc resistance 1/3   Resisted wall taps - inferior and superior diagonals 10x ea Blue Added 12/30, progressed 1/3    Rhythmic stab with ball  Hold at 90°  3x30\"  Small stability ball Added 12/30   SA slides 2x10 blue Added 12/30, added resistance 1/3    Ball on wall cw/ccw  15xea  2.2#  Added 1/3             Other:  Postural education provided for seated positioning during work         Specific Instructions for next treatment: manual to L UT, SOR, cervical paraspinals, progress scapular and deep cervical flexor strengthening, restore cervical ROM, reinforce postural education         Treatment Charges: Mins Units   []  Modalities     [x]  Ther Exercise 42 3   []  Manual Therapy     []  Ther Activities     []  Aquatics     []  Vasocompression     []  Other     Total Treatment time 42 3       Assessment: [x] Progressing toward goals. Initiated treatment with UBE warm up followed by seated postural exercises with good tolerance. Able increase resistance for B. ER, B. H. Abd, and wall taps to improve scapular strength with muscular fatigue noted however, no increase in pain. Pt received updated HEP and blue theraband for home use. Will continue to monitor pt response to treatment and progress as able. [] No change.      [] Other:  [x] Patient would continue to benefit from skilled physical therapy services in order to: reduce pain, restore cervical mobility, and increase deep cervical flexor and scapular strengthening in order to reduce difficulty with work and household related tasks. Problems:    [x]? ? Pain: up to 8/10 pain L sided neck and shoulder  [x]? ? ROM: reduced cervical ROM globally   [x]? ? Strength: reduced deep cervical flexor and scapular strengthening  [x]? ? Function: impaired function indicated by NDI score of 20% impaired   [x]? Other: impaired posture       STG: (to be met in 8 treatments)  1. ? Pain: Pt to report neck pain levels at 5/10 or less to improve tolerance to therapeutic exercise progressions. 2. ? ROM: Pt to improve cervical rotation AROM to at least 70 degrees and cervical sidebend to at least 45 degrees bilaterally to ease difficulty with ADLs. 3. ? Strength: Pt to demonstrate improved scapular strength at 4+/5 globally to improve independence with heavier lifting and carrying activities. a. Pt to demonstrate increased deep cervical flexor strengthening with ability to perform 10 chin tucks without increased pain or compensation. 4. ? Function: Pt to demonstrate improved postural awareness in seated position for reduced pain with sustained sitting at work. 5. Patient to be independent with home exercise program as demonstrated by performance with correct form without cues.     LTG: (to be met in 12 treatments)  1. Pt to report neck pain levels at 0-1/10 to improve tolerance with all daily activities. 2. Pt to demonstrate improved function with NDI score of 10% functionally impaired or less. 3. Pt to demonstrate improved functional strength with ability to lift and carry at least 10# without increased pain.      Patient goals: improve strength and flexibility     Pt. Education:  [x] Yes  [] No  [] Reviewed Prior HEP/Ed  Method of Education: [x] Verbal  [x] Demo  [x] Written  Access Code: [email or web address]  URL: [email or web address]  Date: 11/19/2021  Prepared by: Júnior Machado  Seated Upper Trapezius Stretch - 1 x daily - 7 x weekly - 3 sets - 20\" hold  Seated Scapular Retraction - 1 x daily - 7 x weekly - 10 reps - 3\" hold  Correct Seated Posture - 1 x daily - 7 x weekly  Seated Cervical Retraction - 1 x daily - 7 x weekly - 3 sets - 10 reps  Doorway Pec Stretch at 90 Degrees Abduction - 1 x daily - 7 x weekly - 3 sets - 10 reps  Putty Squeezes - 1 x daily - 7 x weekly - 2 sets - 10 reps  Scapular Retraction with Resistance - 1 x daily - 7 x weekly - 2 sets - 10 reps  Standing Shoulder External Rotation with Resistance - 1 x daily - 7 x weekly - 2 sets - 10 reps  Wall Push Up - 1 x daily - 7 x weekly - 2 sets - 10 reps  Wall [place] - 1 x daily - 7 x weekly - 1 sets - 10 reps      Access Code: [email or web address]  URL: [email or web address]  Date: 01/03/2022  Prepared by: Juliocesar Ibarra    Exercises  Standing Shoulder Horizontal Abduction with Resistance - 1 x daily - 7 x weekly - 3 sets - 10 reps  Standing Shoulder External Rotation with Resistance - 1 x daily - 7 x weekly - 3 sets - 10 reps  Wall Clock with Theraband - 1 x daily - 7 x weekly - 3 sets - 10 reps  Standing Plank on Wall with Reaches and Resistance - 1 x daily - 7 x weekly - 3 sets - 10 reps  Serratus Activation at Wall with Foam Roll and Resistance Band - 1 x daily - 7 x weekly - 3 sets - 10 reps    11/23- updated medbridge code [id number] to include scapular depression, bilateral ER, bilateral H. Abd   Comprehension of Education:  [x] Verbalizes understanding. [x] Demonstrates understanding. [] Needs review. [x] Demonstrates/verbalizes HEP/Ed previously given. Plan: [x] Continue current frequency toward long and short term goals.     [] Specific Instructions for subsequent treatments:        Time In: 5:00 pm             Time Out: 5:46 pm    Electronically signed by:  Juliocesar Ibarra PTA

## 2022-01-10 ENCOUNTER — HOSPITAL ENCOUNTER (OUTPATIENT)
Dept: PHYSICAL THERAPY | Facility: CLINIC | Age: 47
Setting detail: THERAPIES SERIES
Discharge: HOME | End: 2022-01-10
Payer: COMMERCIAL

## 2022-01-10 PROCEDURE — 97110 THERAPEUTIC EXERCISES: CPT

## 2022-01-10 NOTE — FLOWSHEET NOTE
[] [facility]) [facility] &  Therapy  [address]  [phone]  F: [phone] [x] [address]  [facility] 36   [address]  P: [phone]  F: [phone] [] [address] &  Therapy  [address]  P: [phone]  F: [phone] [] [address]  P: [phone]  F: [phone] [] [address]  [facility]   [address]   [place]: [phone]  F: [phone]      Physical Therapy Daily Treatment Note    Date:  1/10/2022  Patient Name:  Nikia Zuniga    :  1975  MRN: [id number]  Physician: Keesha Spencer MD               Insurance: [address]  (55S/12, $30 copay)  Medical Diagnosis: M50.30- Degenerative disc disease, cervical              Rehab Codes: M50.30, M25.512, 929.3, M54. 2  Onset Date: 2021                              Next 's appt. :   Visit# / total visits:      Cancels/No Shows : 0/0    Subjective:    Pain:  [] Yes  [x] No Location: L sided neck and shoulder  Pain Rating: (0-10 scale) 0/10  Pain altered Tx:  [x] No  [] Yes  Action:  Comments :  Pt reports no pain in neck or shoulder this date and that his finger pain which began prior to last session has since resolved.        Objective:  Modalities:   Precautions:  Exercises: Bold completed 1/3   Exercise Reps/ Time Weight/ Level Comments   UBE 2'/2' lv 1           Manual:  11 min   SOR, UT releases, MFR to c paraspinals - not today   MFR using hypervolt cushion attachment x 4 minutes to L UT/ mid back         Supine       Chin tucks 10x5\"   not today    Chin tucks + rotation 5x5\"ea     Cervical mobs over ball 5x5\" ea  All directions   Scapular Depression 10x5\"  Added  not today          Seated      Chin tucks 10x5\"       Scapular retractions 10x2   5\"        Retro shoulder rolls  10x2  Inc 12/30   UT stretch 3x20\"       LS stretch 3x20\"   Added 1/10   Midback Stretch 3x20\"  Added 1/10         Standing       Tband rows 20x plum Inc 12/30   tband ext 20x plum Added 12/30   B. ER 20x [place]  Chin Tuck- against wall for cueing - added 11/23, inc resistance 1/3   B. H. Abd 20x [place]  Chin Tuck- against wall for cueing- added 11/23, inc resistance 1/3    Wall angels 10x  Cues for chin tuck   Wall push ups 2x10     Reverse wall push up 2x10     Doorway Pec stretch 3x30\"     Resisted wall taps 10x 2 ea  blue Lateral with scap retration Added 12/30, inc resistance 1/3   Resisted wall taps - inferior and superior diagonals 10x2 ea Blue Added 12/30, progressed 1/3    Rhythmic stab with ball  Hold at 90°  3x30\"  Small stability ball Added 12/30   SA slides 2x10 blue Added 12/30, added resistance 1/3    Ball on wall cw/ccw  15xea  2.2#  Added 1/3    Cervical Mobs  10xea 1.1# ball  Added 1/10, flex/ext, rotation      Other:  Postural education provided for seated positioning during work         Specific Instructions for next treatment: manual to L UT, SOR, cervical paraspinals, progress scapular and deep cervical flexor strengthening, restore cervical ROM, reinforce postural education         Treatment Charges: Mins Units   []  Modalities     [x]  Ther Exercise 38 3   [x]  Manual Therapy 4 --   []  Ther Activities     []  Aquatics     []  Vasocompression     []  Other     Total Treatment time 42 3       Assessment: [x] Progressing toward goals. Initiated treatment with UBE warm up followed by charted exercises. Focused primarily on standing exercises this date. Added cervical mobs with ball against wall with fair tolerance, slightly limited in rotation to L with slight increased pain that does improve with rest. Progressed repetitions of resisted wall taps. Pt demonstrates shoulder fatigue at end of session.  Requires cueing for chin tuck throughout exercises as he has the tendency to extend cervical spine instead of retraction. Good carryover following cues. Pt with slight increased L sided midback pain at end of session so added MFR using hypervolt to help relieve soft tissue soreness. Positive relief of symptoms post manual.     [] No change. [] Other:  [x] Patient would continue to benefit from skilled physical therapy services in order to: reduce pain, restore cervical mobility, and increase deep cervical flexor and scapular strengthening in order to reduce difficulty with work and household related tasks. Problems:    [x]? ? Pain: up to 8/10 pain L sided neck and shoulder  [x]? ? ROM: reduced cervical ROM globally   [x]? ? Strength: reduced deep cervical flexor and scapular strengthening  [x]? ? Function: impaired function indicated by NDI score of 20% impaired   [x]? Other: impaired posture       STG: (to be met in 8 treatments)  1. ? Pain: Pt to report neck pain levels at 5/10 or less to improve tolerance to therapeutic exercise progressions. 2. ? ROM: Pt to improve cervical rotation AROM to at least 70 degrees and cervical sidebend to at least 45 degrees bilaterally to ease difficulty with ADLs. 3. ? Strength: Pt to demonstrate improved scapular strength at 4+/5 globally to improve independence with heavier lifting and carrying activities. a. Pt to demonstrate increased deep cervical flexor strengthening with ability to perform 10 chin tucks without increased pain or compensation. 4. ? Function: Pt to demonstrate improved postural awareness in seated position for reduced pain with sustained sitting at work. 5. Patient to be independent with home exercise program as demonstrated by performance with correct form without cues.     LTG: (to be met in 12 treatments)  1. Pt to report neck pain levels at 0-1/10 to improve tolerance with all daily activities. 2. Pt to demonstrate improved function with NDI score of 10% functionally impaired or less.    3. Pt to demonstrate improved functional strength with ability to lift and carry at least 10# without increased pain.      Patient goals: improve strength and flexibility     Pt. Education:  [x] Yes  [] No  [x] Reviewed Prior HEP/Ed  Method of Education: [x] Verbal  [x] Demo  [] Written  Access Code: [email or web address]  URL: [email or web address] com/  Date: 11/19/2021  Prepared by: Levi Barrera    Exercises  Seated Upper Trapezius Stretch - 1 x daily - 7 x weekly - 3 sets - 20\" hold  Seated Scapular Retraction - 1 x daily - 7 x weekly - 10 reps - 3\" hold  Correct Seated Posture - 1 x daily - 7 x weekly  Seated Cervical Retraction - 1 x daily - 7 x weekly - 3 sets - 10 reps  Doorway Pec Stretch at 90 Degrees Abduction - 1 x daily - 7 x weekly - 3 sets - 10 reps  Putty Squeezes - 1 x daily - 7 x weekly - 2 sets - 10 reps  Scapular Retraction with Resistance - 1 x daily - 7 x weekly - 2 sets - 10 reps  Standing Shoulder External Rotation with Resistance - 1 x daily - 7 x weekly - 2 sets - 10 reps  Wall Push Up - 1 x daily - 7 x weekly - 2 sets - 10 reps  Wall [place] - 1 x daily - 7 x weekly - 1 sets - 10 reps      Access Code: [email or web address]  URL: [email or web address] com/  Date: 01/03/2022  Prepared by: Da Pierce    Exercises  Standing Shoulder Horizontal Abduction with Resistance - 1 x daily - 7 x weekly - 3 sets - 10 reps  Standing Shoulder External Rotation with Resistance - 1 x daily - 7 x weekly - 3 sets - 10 reps  Wall Clock with Theraband - 1 x daily - 7 x weekly - 3 sets - 10 reps  Standing Plank on Wall with Reaches and Resistance - 1 x daily - 7 x weekly - 3 sets - 10 reps  Serratus Activation at Wall with Foam Roll and Resistance Band - 1 x daily - 7 x weekly - 3 sets - 10 reps    11/23- [facility] [id number] to include scapular depression, bilateral ER, bilateral H. Abd   Comprehension of Education:  [] Verbalizes understanding. [] Demonstrates understanding. [] Needs review.    [x] Demonstrates/verbalizes HEP/Ed previously given. Plan: [x] Continue current frequency toward long and short term goals.     [x] Specific Instructions for subsequent treatments:  Reassess goals at next session      Time In: 5:00 pm             Time Out: 5:46 pm    Electronically signed by:  Giovanny Fournier PT

## 2022-01-12 PROBLEM — H60.92 OTITIS EXTERNA OF LEFT EAR: Status: RESOLVED | Noted: 2021-02-17 | Resolved: 2022-01-12

## 2022-01-12 PROBLEM — M54.2 NECK ACHE: Status: RESOLVED | Noted: 2021-10-25 | Resolved: 2022-01-12

## 2022-01-12 PROBLEM — B35.4 TINEA CORPORIS: Status: ACTIVE | Noted: 2022-01-12

## 2022-01-12 PROBLEM — R19.12: Status: RESOLVED | Noted: 2021-02-17 | Resolved: 2022-01-12

## 2022-01-12 PROBLEM — J02.8 ACUTE PHARYNGITIS DUE TO OTHER SPECIFIED ORGANISMS: Status: RESOLVED | Noted: 2021-10-25 | Resolved: 2022-01-12

## 2022-01-13 ENCOUNTER — HOSPITAL ENCOUNTER (OUTPATIENT)
Dept: PHYSICAL THERAPY | Facility: CLINIC | Age: 47
Setting detail: THERAPIES SERIES
Discharge: HOME | End: 2022-01-13
Payer: COMMERCIAL

## 2022-01-13 PROCEDURE — 97110 THERAPEUTIC EXERCISES: CPT

## 2022-01-13 NOTE — FLOWSHEET NOTE
[] [facility]) - [facility] &  Therapy  [address]  [phone]  F: [phone] [x] [address]  [facility] 36   [address]  P: [phone]  F: [phone] [] [address] &  Therapy  [address]  P: [phone]  F: [phone] [] [address]  P: [phone]  F: [phone] [] [address]  [facility]   [address]   [place]: [phone]  F: [phone]      Physical Therapy Daily Treatment Note    Date:  2022  Patient Name:  Ange Hernadez    :  1975  MRN: [id number]  Physician: Kashif Oakes MD               Insurance: [employer] (18K/65, $30 copay)  Medical Diagnosis: M50.30- Degenerative disc disease, cervical              Rehab Codes: M50.30, M25.512, 929.3, M54. 2  Onset Date: 2021                              Next 's appt. :   Visit# / total visits:      Cancels/No Shows : 0/0    Subjective:    Pain:  [] Yes  [x] No Location: L sided neck and shoulder  Pain Rating: (0-10 scale) 0/10  Pain altered Tx:  [x] No  [] Yes  Action:  Comments :  Pt reports no pain in neck or shoulder this date and that his finger pain which began prior to last session has since resolved.        Objective:  Modalities:   Precautions:  Exercises: Bold completed 1/3   Exercise Reps/ Time Weight/ Level Comments   UBE 2'/2' lv 1           Manual:  11 min   SOR, UT releases, MFR to c paraspinals - not today   MFR using hypervolt cushion attachment x 4 minutes to L UT/ mid back         Supine       Chin tucks 10x5\"   not today    Chin tucks + rotation 5x5\"ea     Cervical mobs over ball 5x5\" ea  All directions   Scapular Depression 10x5\"  Added  not today 12/30         Seated      Chin tucks 10x5\"       Scapular retractions 10x2   5\"        Retro shoulder rolls  10x2  Inc 12/30   UT stretch 3x20\"       LS stretch 3x20\"   Added 1/10   Midback Stretch 3x20\"  Added 1/10         Standing       Tband rows 20x plum Inc 12/30   tband ext 20x plum Added 12/30   B. ER 20x [place]  Chin Tuck- against wall for cueing - added 11/23, inc resistance 1/3   B.  H. Abd 20x [place]  Chin Tuck- against wall for cueing- added 11/23, inc resistance 1/3    Wall angels 10x  Cues for chin tuck   Wall push ups 2x10     Reverse wall push up 2x10     Doorway Pec stretch 3x30\"     Resisted wall taps 10x 2 ea  blue Lateral with scap retration Added 12/30, inc resistance 1/3   Resisted wall taps - inferior and superior diagonals 10x2 ea Blue Added 12/30, progressed 1/3    Rhythmic stab with ball  Hold at 90°  3x30\"  Small stability ball Added 12/30   SA slides 2x10 blue Added 12/30, added resistance 1/3    Ball on wall cw/ccw  20xea  2.2#  Added 1/3    Cervical Mobs  10xea 1.1# ball  Added 1/10, flex/ext, rotation      Other:  Postural education provided for seated positioning during work     [employer] by primary PT 1/13 STRENGTH   ROM     Left Right Cervical     C5 Shld Abd 5 5 Flexion 40   Shld Flexion 5 5 Extension 50   Shld IR 5 5 Rotation L- 66 R- 80   Shld ER 5 5 Sidebend L- 35 R- 45   C6 Elb Flex 5 5 Retraction     C7 Elb Ext 5 5 Lumbar     C8 EPL     Flexion     T1 Fing Abd     Extension      78 58 Rotation L  R         Sidebend L R         UE/LE L R         shoulder WFL globally [facility] globally                                                              Functional Test: NDI Score: 10/50 or 20% functionally impaired at initial evaluation Score: 8/50 or 16% this date              Specific Instructions for next treatment: manual to L UT, SOR, cervical paraspinals, progress scapular and deep cervical flexor strengthening, restore cervical ROM, reinforce postural education         Treatment Charges: Mins Units   []  Modalities     [x]  Ther Exercise 33 2   [] limited with L rotation and SB  3. ? Strength: Pt to demonstrate improved scapular strength at 4+/5 globally to improve independence with heavier lifting and carrying activities. MET    a. Pt to demonstrate increased deep cervical flexor strengthening with ability to perform 10 chin tucks without increased pain or compensation. MET   4. ? Function: Pt to demonstrate improved postural awareness in seated position for reduced pain with sustained sitting at work. MET   5. Patient to be independent with home exercise program as demonstrated by performance with correct form without cues. MET     LTG: (to be met in 12 treatments)  1. Pt to report neck pain levels at 0-1/10 to improve tolerance with all daily activities. 2. Pt to demonstrate improved function with NDI score of 10% functionally impaired or less. 3. Pt to demonstrate improved functional strength with ability to lift and carry at least 10# without increased pain.      Patient goals: improve strength and flexibility     Pt. Education:  [x] Yes  [] No  [x] Reviewed Prior HEP/Ed  Method of Education: [x] Verbal  [x] Demo  [] Written  Access Code: [email or web address]  URL: [email or web address] com/  Date: 11/19/2021  Prepared by: Alejandrina Snowball    Exercises  Seated Upper Trapezius Stretch - 1 x daily - 7 x weekly - 3 sets - 20\" hold  Seated Scapular Retraction - 1 x daily - 7 x weekly - 10 reps - 3\" hold  Correct Seated Posture - 1 x daily - 7 x weekly  Seated Cervical Retraction - 1 x daily - 7 x weekly - 3 sets - 10 reps  Doorway Pec Stretch at 90 Degrees Abduction - 1 x daily - 7 x weekly - 3 sets - 10 reps  Putty Squeezes - 1 x daily - 7 x weekly - 2 sets - 10 reps  Scapular Retraction with Resistance - 1 x daily - 7 x weekly - 2 sets - 10 reps  Standing Shoulder External Rotation with Resistance - 1 x daily - 7 x weekly - 2 sets - 10 reps  Wall Push Up - 1 x daily - 7 x weekly - 2 sets - 10 reps  Wall [place] - 1 x daily - 7 x weekly - 1 sets - 10 reps      Access Code: [email or web address]  URL: [email or web address]/  Date: 01/03/2022  Prepared by: Jesus Wilson    Exercises  Standing Shoulder Horizontal Abduction with Resistance - 1 x daily - 7 x weekly - 3 sets - 10 reps  Standing Shoulder External Rotation with Resistance - 1 x daily - 7 x weekly - 3 sets - 10 reps  Wall Clock with Theraband - 1 x daily - 7 x weekly - 3 sets - 10 reps  Standing Plank on Wall with Reaches and Resistance - 1 x daily - 7 x weekly - 3 sets - 10 reps  Serratus Activation at Wall with Foam Roll and Resistance Band - 1 x daily - 7 x weekly - 3 sets - 10 reps    1/13- provided plum band to progress HEP  11/23- updated medbridge code [id number] to include scapular depression, bilateral ER, bilateral H. Abd     Comprehension of Education:  [x] Verbalizes understanding. [x] Demonstrates understanding. [] Needs review. [] Demonstrates/verbalizes HEP/Ed previously given. Plan: [] Continue current frequency toward long and short term goals. [x] Specific Instructions for subsequent treatments:  Pt requests to hold therapy due to improvement of symptoms.  Will call back to schedule appt if needed, otherwise plan to continue HEP independently        Time In: 5:00 pm             Time Out: 5:37 pm    Electronically signed by:  Paula Crespo PT

## 2022-02-07 NOTE — DISCHARGE SUMMARY
[] [facility]        Outpatient Physical                Therapy       [address]       Phone: [phone]       Fax: [phone] [x] [facility] at [address]       Phone: [phone]       Fax: [phone] [] Benjy Villaseñor [facility]     [address]     Phone: [phone]     Fax:  [phone]     Physical Therapy Discharge Note    Date: 2022      Patient: Olena Mata  : 1975  MRN: [id number]    Sailaja Rm MD               Insurance: [employer] (60v/60, $30 copay)  Medical Diagnosis: M50.30- Degenerative disc disease, cervical              Rehab Codes: M50.30, M25.512, 929.3, M54. 2  Onset Date: 1396                              FBIV 's appt. :   Visit# / total visits:                       Cancels/No Shows : 0/0  Date of initial visit: 11/15/21               Date of final visit: 22       Discharge Status:     Held physical therapy services after 8th visit due to significant resolution in symptoms and advised pt to continue with HEP independently and call clinic to schedule next appointment if symptoms arise. Pt failed to make additional appointments for therapy. Pt. Is now discharged. Electronically signed by: Lyric Eisenberg PT    If you have any questions or concerns, please don't hesitate to call.   Thank you for your referral.

## 2022-06-03 PROBLEM — E55.9 VITAMIN D DEFICIENCY: Status: ACTIVE | Noted: 2022-06-03

## 2022-06-03 PROBLEM — M25.532 BILATERAL WRIST PAIN: Status: ACTIVE | Noted: 2022-06-03

## 2022-06-03 PROBLEM — M25.531 BILATERAL WRIST PAIN: Status: ACTIVE | Noted: 2022-06-03

## 2022-06-22 PROBLEM — M19.032 PRIMARY OSTEOARTHRITIS OF BOTH WRISTS: Status: ACTIVE | Noted: 2022-06-22

## 2022-06-22 PROBLEM — M19.031 PRIMARY OSTEOARTHRITIS OF BOTH WRISTS: Status: ACTIVE | Noted: 2022-06-22

## 2022-07-21 PROBLEM — U07.1 COVID-19 VIRUS INFECTION: Status: ACTIVE | Noted: 2022-07-21

## 2022-07-21 PROBLEM — R06.6 HICCUPS: Status: ACTIVE | Noted: 2022-07-21

## 2022-09-21 PROBLEM — Z23 NEED FOR TETANUS BOOSTER: Status: ACTIVE | Noted: 2022-09-21

## 2022-09-21 PROBLEM — Z71.85 IMMUNIZATION COUNSELING: Status: ACTIVE | Noted: 2020-03-05

## 2022-09-21 PROBLEM — Z11.59 NEED FOR HEPATITIS C SCREENING TEST: Status: ACTIVE | Noted: 2022-09-21

## 2022-10-06 PROBLEM — L02.92 FURUNCULOSIS: Status: ACTIVE | Noted: 2022-10-06

## 2022-10-21 PROBLEM — Z11.59 NEED FOR HEPATITIS C SCREENING TEST: Status: RESOLVED | Noted: 2022-09-21 | Resolved: 2022-10-21

## 2022-10-26 PROBLEM — F43.9 STRESS: Status: ACTIVE | Noted: 2022-10-26

## 2023-02-25 PROBLEM — Z13.31 DEPRESSION SCREEN: Status: ACTIVE | Noted: 2020-03-05

## 2023-03-27 PROBLEM — Z13.31 DEPRESSION SCREEN: Status: RESOLVED | Noted: 2020-03-05 | Resolved: 2023-03-27

## 2023-04-21 PROBLEM — R80.9 MICROALBUMINURIA: Status: ACTIVE | Noted: 2023-04-21

## 2023-09-26 ENCOUNTER — HOSPITAL ENCOUNTER (OUTPATIENT)
Dept: GENERAL RADIOLOGY | Age: 48
Discharge: HOME | End: 2023-09-28
Payer: COMMERCIAL

## 2023-09-26 ENCOUNTER — HOSPITAL ENCOUNTER (OUTPATIENT)
Age: 48
Discharge: HOME | End: 2023-09-26

## 2023-09-26 DIAGNOSIS — M50.30 DEGENERATIVE DISC DISEASE, CERVICAL: ICD-10-CM

## 2023-09-26 DIAGNOSIS — M54.50 LUMBAR PAIN: ICD-10-CM

## 2023-09-26 PROCEDURE — 72100 X-RAY EXAM L-S SPINE 2/3 VWS: CPT

## 2023-09-26 PROCEDURE — 72040 X-RAY EXAM NECK SPINE 2-3 VW: CPT

## 2023-10-03 PROBLEM — M54.50 LUMBAR PAIN: Status: ACTIVE | Noted: 2023-10-03

## 2023-10-06 PROBLEM — M47.816 SPONDYLOSIS OF LUMBAR REGION WITHOUT MYELOPATHY OR RADICULOPATHY: Status: ACTIVE | Noted: 2023-10-06

## 2023-12-21 PROBLEM — E78.01 FAMILIAL HYPERCHOLESTEROLEMIA: Status: ACTIVE | Noted: 2023-12-21

## 2024-04-29 PROBLEM — R21 RASH IN ADULT: Status: ACTIVE | Noted: 2024-04-29

## 2024-07-26 PROBLEM — E66.3 OVER WEIGHT: Status: ACTIVE | Noted: 2024-07-26

## 2025-03-01 ENCOUNTER — HOSPITAL ENCOUNTER (OUTPATIENT)
Age: 50
Discharge: HOME | End: 2025-03-01
Payer: COMMERCIAL

## 2025-03-01 DIAGNOSIS — R80.9 MICROALBUMINURIA: ICD-10-CM

## 2025-03-01 DIAGNOSIS — E11.9 TYPE 2 DIABETES MELLITUS WITHOUT COMPLICATION, WITHOUT LONG-TERM CURRENT USE OF INSULIN (HCC): ICD-10-CM

## 2025-03-01 LAB
CREAT UR-MCNC: 66.1 MG/DL (ref 39–259)
EST. AVERAGE GLUCOSE BLD GHB EST-MCNC: 180 MG/DL
HBA1C MFR BLD: 7.9 % (ref 4–6)
MICROALBUMIN UR-MCNC: <12 MG/L (ref 0–20)
MICROALBUMIN/CREAT UR-RTO: NORMAL MCG/MG CREAT (ref 0–17)

## 2025-03-01 PROCEDURE — 82043 UR ALBUMIN QUANTITATIVE: CPT

## 2025-03-01 PROCEDURE — 82570 ASSAY OF URINE CREATININE: CPT

## 2025-03-01 PROCEDURE — 36415 COLL VENOUS BLD VENIPUNCTURE: CPT

## 2025-03-01 PROCEDURE — 83036 HEMOGLOBIN GLYCOSYLATED A1C: CPT

## 2025-04-19 ENCOUNTER — HOSPITAL ENCOUNTER (OUTPATIENT)
Age: 50
Discharge: HOME | End: 2025-04-19
Payer: COMMERCIAL

## 2025-04-19 DIAGNOSIS — M50.30 DEGENERATIVE DISC DISEASE, CERVICAL: ICD-10-CM

## 2025-04-19 DIAGNOSIS — E11.9 TYPE 2 DIABETES MELLITUS WITHOUT COMPLICATION, WITHOUT LONG-TERM CURRENT USE OF INSULIN: ICD-10-CM

## 2025-04-19 DIAGNOSIS — E78.01 FAMILIAL HYPERCHOLESTEROLEMIA: ICD-10-CM

## 2025-04-19 LAB
ALBUMIN SERPL-MCNC: 4.1 G/DL (ref 3.5–5.2)
ALBUMIN/GLOB SERPL: 1.5 {RATIO} (ref 1–2.5)
ALP SERPL-CCNC: 100 U/L (ref 40–129)
ALT SERPL-CCNC: 26 U/L (ref 10–50)
ANION GAP SERPL CALCULATED.3IONS-SCNC: 14 MMOL/L (ref 9–16)
AST SERPL-CCNC: 18 U/L (ref 10–50)
BILIRUB SERPL-MCNC: 0.9 MG/DL (ref 0–1.2)
BUN SERPL-MCNC: 9 MG/DL (ref 6–20)
CALCIUM SERPL-MCNC: 8.8 MG/DL (ref 8.6–10.4)
CHLORIDE SERPL-SCNC: 111 MMOL/L (ref 98–107)
CO2 SERPL-SCNC: 25 MMOL/L (ref 20–31)
CREAT SERPL-MCNC: 0.6 MG/DL (ref 0.7–1.2)
GFR, ESTIMATED: >90 ML/MIN/1.73M2
GLUCOSE P FAST SERPL-MCNC: 161 MG/DL (ref 74–99)
POTASSIUM SERPL-SCNC: 4 MMOL/L (ref 3.7–5.3)
PROT SERPL-MCNC: 6.9 G/DL (ref 6.6–8.7)
SODIUM SERPL-SCNC: 150 MMOL/L (ref 136–145)

## 2025-04-19 PROCEDURE — 80053 COMPREHEN METABOLIC PANEL: CPT

## 2025-04-19 PROCEDURE — 36415 COLL VENOUS BLD VENIPUNCTURE: CPT

## 2025-04-21 PROBLEM — E87.0 HYPERNATREMIA: Status: ACTIVE | Noted: 2025-04-21

## 2025-04-22 LAB
ALBUMIN SERPL-MCNC: 4.1 G/DL (ref 3.5–5.2)
ALBUMIN/GLOB SERPL: 1.5 {RATIO} (ref 1–2.5)
ALP SERPL-CCNC: 100 U/L (ref 40–129)
ALT SERPL-CCNC: 26 U/L (ref 10–50)
ANION GAP SERPL CALCULATED.3IONS-SCNC: 12 MMOL/L (ref 9–16)
AST SERPL-CCNC: 18 U/L (ref 10–50)
BILIRUB SERPL-MCNC: 0.9 MG/DL (ref 0–1.2)
BUN SERPL-MCNC: 9 MG/DL (ref 6–20)
CALCIUM SERPL-MCNC: 8.8 MG/DL (ref 8.6–10.4)
CHLORIDE SERPL-SCNC: 102 MMOL/L (ref 98–107)
CO2 SERPL-SCNC: 25 MMOL/L (ref 20–31)
CREAT SERPL-MCNC: 0.6 MG/DL (ref 0.7–1.2)
GFR, ESTIMATED: >90 ML/MIN/1.73M2
GLUCOSE P FAST SERPL-MCNC: 161 MG/DL (ref 74–99)
POTASSIUM SERPL-SCNC: 4 MMOL/L (ref 3.7–5.3)
PROT SERPL-MCNC: 6.9 G/DL (ref 6.6–8.7)
SODIUM SERPL-SCNC: 139 MMOL/L (ref 136–145)

## 2025-05-26 ENCOUNTER — HOSPITAL ENCOUNTER (OUTPATIENT)
Age: 50
Discharge: HOME | End: 2025-05-26
Payer: COMMERCIAL

## 2025-05-26 DIAGNOSIS — E11.9 TYPE 2 DIABETES MELLITUS WITHOUT COMPLICATION, WITHOUT LONG-TERM CURRENT USE OF INSULIN (HCC): ICD-10-CM

## 2025-05-26 LAB
ALBUMIN SERPL-MCNC: 4.4 G/DL (ref 3.5–5.2)
ALBUMIN/GLOB SERPL: 1.6 {RATIO} (ref 1–2.5)
ALP SERPL-CCNC: 101 U/L (ref 40–129)
ALT SERPL-CCNC: 24 U/L (ref 10–50)
ANION GAP SERPL CALCULATED.3IONS-SCNC: 14 MMOL/L (ref 9–16)
AST SERPL-CCNC: 17 U/L (ref 10–50)
BILIRUB SERPL-MCNC: 0.9 MG/DL (ref 0–1.2)
BUN SERPL-MCNC: 9 MG/DL (ref 6–20)
CALCIUM SERPL-MCNC: 9.4 MG/DL (ref 8.6–10.4)
CHLORIDE SERPL-SCNC: 99 MMOL/L (ref 98–107)
CO2 SERPL-SCNC: 25 MMOL/L (ref 20–31)
CREAT SERPL-MCNC: 0.7 MG/DL (ref 0.7–1.2)
GFR, ESTIMATED: >90 ML/MIN/1.73M2
GLUCOSE SERPL-MCNC: 155 MG/DL (ref 74–99)
POTASSIUM SERPL-SCNC: 4.3 MMOL/L (ref 3.7–5.3)
PROT SERPL-MCNC: 7.1 G/DL (ref 6.6–8.7)
SODIUM SERPL-SCNC: 138 MMOL/L (ref 136–145)

## 2025-05-26 PROCEDURE — 80053 COMPREHEN METABOLIC PANEL: CPT

## 2025-05-26 PROCEDURE — 36415 COLL VENOUS BLD VENIPUNCTURE: CPT

## 2025-05-30 ENCOUNTER — HOSPITAL ENCOUNTER (OUTPATIENT)
Dept: GENERAL RADIOLOGY | Age: 50
End: 2025-05-30
Payer: COMMERCIAL

## 2025-05-30 ENCOUNTER — HOSPITAL ENCOUNTER (OUTPATIENT)
Age: 50
Discharge: HOME | End: 2025-05-30
Payer: COMMERCIAL

## 2025-05-30 DIAGNOSIS — M25.531 RIGHT WRIST PAIN: ICD-10-CM

## 2025-05-30 DIAGNOSIS — M77.01 MEDIAL EPICONDYLITIS OF RIGHT ELBOW: ICD-10-CM

## 2025-05-30 PROCEDURE — 73110 X-RAY EXAM OF WRIST: CPT

## 2025-05-30 PROCEDURE — 73070 X-RAY EXAM OF ELBOW: CPT

## 2025-06-13 ENCOUNTER — HOSPITAL ENCOUNTER (OUTPATIENT)
Age: 50
Discharge: HOME | End: 2025-06-13
Payer: COMMERCIAL

## 2025-06-13 DIAGNOSIS — E11.9 TYPE 2 DIABETES MELLITUS WITHOUT COMPLICATION, WITHOUT LONG-TERM CURRENT USE OF INSULIN (HCC): ICD-10-CM

## 2025-06-13 LAB
EST. AVERAGE GLUCOSE BLD GHB EST-MCNC: 166 MG/DL
HBA1C MFR BLD: 7.4 % (ref 4–6)

## 2025-06-13 PROCEDURE — 83036 HEMOGLOBIN GLYCOSYLATED A1C: CPT

## 2025-06-13 PROCEDURE — 36415 COLL VENOUS BLD VENIPUNCTURE: CPT

## 2025-06-14 ENCOUNTER — HOSPITAL ENCOUNTER (EMERGENCY)
Age: 50
Discharge: HOME | End: 2025-06-14
Attending: EMERGENCY MEDICINE
Payer: COMMERCIAL

## 2025-06-14 VITALS
SYSTOLIC BLOOD PRESSURE: 112 MMHG | BODY MASS INDEX: 27.28 KG/M2 | DIASTOLIC BLOOD PRESSURE: 93 MMHG | HEART RATE: 111 BPM | RESPIRATION RATE: 16 BRPM | HEIGHT: 68 IN | WEIGHT: 180 LBS | OXYGEN SATURATION: 96 % | TEMPERATURE: 98.3 F

## 2025-06-14 DIAGNOSIS — L02.91 ABSCESS: Primary | ICD-10-CM

## 2025-06-14 PROCEDURE — 6360000002 HC RX W HCPCS

## 2025-06-14 PROCEDURE — 99284 EMERGENCY DEPT VISIT MOD MDM: CPT | Performed by: EMERGENCY MEDICINE

## 2025-06-14 PROCEDURE — 10060 I&D ABSCESS SIMPLE/SINGLE: CPT | Performed by: EMERGENCY MEDICINE

## 2025-06-14 RX ORDER — KETOROLAC TROMETHAMINE 30 MG/ML
30 INJECTION, SOLUTION INTRAMUSCULAR; INTRAVENOUS ONCE
Status: COMPLETED | OUTPATIENT
Start: 2025-06-14 | End: 2025-06-14

## 2025-06-14 RX ORDER — DOXYCYCLINE HYCLATE 100 MG
100 TABLET ORAL 2 TIMES DAILY
Qty: 14 TABLET | Refills: 0 | Status: SHIPPED | OUTPATIENT
Start: 2025-06-14 | End: 2025-06-21

## 2025-06-14 RX ADMIN — KETOROLAC TROMETHAMINE 30 MG: 30 INJECTION, SOLUTION INTRAMUSCULAR at 13:10

## 2025-06-14 ASSESSMENT — PAIN SCALES - GENERAL: PAINLEVEL_OUTOF10: 10

## 2025-06-14 ASSESSMENT — ENCOUNTER SYMPTOMS
WHEEZING: 0
SORE THROAT: 0
RHINORRHEA: 0
ABDOMINAL PAIN: 0
DIARRHEA: 0
SHORTNESS OF BREATH: 0
ROS SKIN COMMENTS: ABSCESS
BACK PAIN: 0
COUGH: 0
NAUSEA: 0
COLOR CHANGE: 1

## 2025-06-14 ASSESSMENT — PAIN DESCRIPTION - LOCATION: LOCATION: HIP

## 2025-06-14 ASSESSMENT — PAIN - FUNCTIONAL ASSESSMENT: PAIN_FUNCTIONAL_ASSESSMENT: 0-10

## 2025-06-14 ASSESSMENT — PAIN DESCRIPTION - ORIENTATION: ORIENTATION: RIGHT

## 2025-06-14 ASSESSMENT — PAIN DESCRIPTION - DESCRIPTORS: DESCRIPTORS: TENDER

## 2025-06-14 NOTE — ED PROVIDER NOTES
[facility] Emergency Department  [address]  [facility] [address]  Phone: [phone]  Fax: [phone]    EMERGENCY DEPARTMENT ENCOUNTER          Pt Name: Zeny Dudley  MRN: [id number]  Birthdate 1975  Date of evaluation: 6/14/2025      CHIEF COMPLAINT       Chief Complaint   Patient presents with    Abscess     Abscess to posterior right hip, \"tripled in size past three days\"; pt reports hx of abscess in same area appx one yr ago, treated with abx then     HISTORY OF PRESENT ILLNESS      Zeny Dudley is a 50 y.o. male who presents with an abscess on his right hip that he first noticed about 1 week ago. He reports that the area started out as somewhat painful and warm. Over the past 3 days, he reports that it almost tripled in size and became much more painful and hot. Due to the location of the abscess, it has been uncomfortable for him to sit, especially on hard surfaces. He has had some dizziness that started about 3 days ago when the abscess started to grow in size, but he denies other systemic symptoms, including fever and chills.    REVIEW OF SYSTEMS     Review of Systems   Constitutional:  Negative for chills, fatigue and fever.   HENT:  Negative for congestion, rhinorrhea and sore throat.    Respiratory:  Negative for cough, shortness of breath and wheezing.    Cardiovascular:  Negative for chest pain, palpitations and leg swelling.   Gastrointestinal:  Negative for abdominal pain, diarrhea and nausea.   Musculoskeletal:  Negative for arthralgias, back pain and myalgias.   Skin:  Positive for color change. Negative for rash and wound.        Abscess   Neurological:  Positive for dizziness. Negative for syncope and headaches.      PAST MEDICAL HISTORY    has a past medical history of Acute pharyngitis due to other specified organisms, Diabetes mellitus (HCC), Hyperlipidemia, Hypertriglyceridemia, Increased bowel sounds, Neck ache, and Otitis externa of left ear.    SURGICAL

## 2025-06-14 NOTE — ED PROVIDER NOTES
[facility] Emergency Department  [address]  [place] [address]  Phone: [phone]  Fax: [phone]      Attending Physician Attestation          CHIEF COMPLAINT       Chief Complaint   Patient presents with    Abscess     Abscess to posterior right hip, \"tripled in size past three days\"; pt reports hx of abscess in same area appx one yr ago, treated with abx then       DIAGNOSTIC RESULTS     LABS:  Labs Reviewed - No data to display    All other labs were within normal range or not returned as of this dictation.    RADIOLOGY:  No orders to display         EMERGENCY DEPARTMENT COURSE:   Vitals:    Vitals:    06/14/25 1214   BP: (!) 112/93   Pulse: (!) 111   Resp: 16   Temp: 98.3 °F (36.8 °C)   TempSrc: Oral   SpO2: 96%   Weight: 81.6 kg (180 lb)   Height: 1.727 m (5' 8\")     -------------------------  BP: (!) 112/93, Temp: 98.3 °F (36.8 °C), Pulse: (!) 111, Respirations: 16             PERTINENT ATTENDING PHYSICIAN COMMENTS:    I performed a history and physical examination of the patient and discussed management with the mid level provider. I reviewed the mid level provider's note and agree with the documented findings and plan of care. Any areas of disagreement are noted on the chart. I was personally present for the key portions of any procedures. I have documented in the chart those procedures where I was not present during the key portions. I have reviewed the emergency nurses triage note. I agree with the chief complaint, past medical history, past surgical history, allergies, medications, social and family history as documented unless otherwise noted below. Documentation of the HPI, Physical Exam and Medical Decision Making performed by mid level providers is based on my personal performance of the HPI, PE and MDM. For Residents, Physician Assistant/ Nurse Practitioner cases/documentation I have personally evaluated this patient and have completed at least one if not all key  elements of the E/M (history, physical exam, and MDM). Additional findings are as noted.        (Please note that portions of this note were completed with a voice recognition program.  Efforts were made to edit the dictations but occasionally words are mis-transcribed.)    Isaías Gunter DO  Attending Emergency Medicine Physician        Isaías Gunter,   06/14/25 5714

## 2025-06-14 NOTE — DISCHARGE INSTRUCTIONS
You were evaluated today for an abscess on your thigh. We did perform an incision and drainage today as well as a dose of Toradol. Toradol is an NSAID like Aleve, Motrin, and Celebrex. We will also prescribe you a course of doxycyline to take. While taking this medication, you should avoid sun exposure as well as utilize sunscreen as it can increase the risk of sun burns.     For the wound on your leg, you can change the dressing once a day when you shower and as needed when it is messy. To wash the site, just use soap and water gently and pat dry. It is normal for there to be more drainage of pus as well as some blood. If the bleeding increases, hold pressure over the wound to stop it. You may consider going to see a dermatologist as well such as [facility].    Please understand that at this time there is no evidence for a more serious underlying process, but that early in the process of an illness or injury, an emergency department workup can be falsely reassuring.  You should contact your family doctor within the next 24 hours for a follow up appointment. If you do not have one, we have attached the \"[facility] Same Day\" Physician line for you to call and they can provide you with one ([phone]). .    THANK YOU!    From [facility] and [facility]    On behalf of the Emergency Department staff at [facility], I would like to thank you for giving us the opportunity to address your health care needs and concerns.    We hope that during your visit, our service was delivered in a professional and caring manner. Please keep [facility] in mind as we walk with you down the path to your own personal wellness.     Please understand that early in the process of an illness or injury, an emergency department workup can be falsely reassuring.  If you notice any worsening, changing or persistent symptoms please call your family doctor or return to the ER immediately.     Tell us how we